# Patient Record
Sex: MALE | Race: OTHER | HISPANIC OR LATINO | Employment: FULL TIME | URBAN - METROPOLITAN AREA
[De-identification: names, ages, dates, MRNs, and addresses within clinical notes are randomized per-mention and may not be internally consistent; named-entity substitution may affect disease eponyms.]

---

## 2024-05-11 ENCOUNTER — HOSPITAL ENCOUNTER (EMERGENCY)
Facility: MEDICAL CENTER | Age: 19
End: 2024-05-11
Attending: EMERGENCY MEDICINE
Payer: COMMERCIAL

## 2024-05-11 ENCOUNTER — APPOINTMENT (OUTPATIENT)
Dept: RADIOLOGY | Facility: MEDICAL CENTER | Age: 19
End: 2024-05-11
Attending: EMERGENCY MEDICINE
Payer: COMMERCIAL

## 2024-05-11 VITALS
BODY MASS INDEX: 23.36 KG/M2 | HEIGHT: 68 IN | HEART RATE: 74 BPM | TEMPERATURE: 97.9 F | OXYGEN SATURATION: 97 % | RESPIRATION RATE: 18 BRPM | WEIGHT: 154.1 LBS | DIASTOLIC BLOOD PRESSURE: 100 MMHG | SYSTOLIC BLOOD PRESSURE: 134 MMHG

## 2024-05-11 DIAGNOSIS — M25.571 ACUTE RIGHT ANKLE PAIN: ICD-10-CM

## 2024-05-11 RX ORDER — ACETAMINOPHEN 325 MG/1
650 TABLET ORAL ONCE
Status: COMPLETED | OUTPATIENT
Start: 2024-05-11 | End: 2024-05-11

## 2024-05-11 RX ADMIN — ACETAMINOPHEN 650 MG: 325 TABLET ORAL at 19:30

## 2024-05-12 NOTE — ED PROVIDER NOTES
"ED Provider Note    CHIEF COMPLAINT  Chief Complaint   Patient presents with    Ankle Pain     Right sided ankle pain for past couple weeks. Playing sports a couple weeks ago and jumped and landed awkwardly on ankle. Pt states \"my work wants me to get it checked out\" denies workmans comp case. Pt endorses pain and swelling to right ankle.        EXTERNAL RECORDS REVIEWED  Other None available    HPI/ROS  LIMITATION TO HISTORY   Select: : None  OUTSIDE HISTORIAN(S):  None    Sumeet Levin is a 19 y.o. male who presents to the emergency department for evaluation of ankle pain.  The patient states that he jumped and landed on someone else's foot 2 weeks ago.  He states that his right ankle rolled underneath him.  He did fall down but did not hit his head.  He states that he has been having persistent pain and swelling of the right lateral ankle.  He states that there was bruising on the foot and the ankle previously but this has improved.  He is coming in today because of the persistent swelling.  He has been able to ambulate on it.  He denies any other injuries.  He is otherwise healthy and does not take any daily medications.  He denies any other complaints at this point.    PAST MEDICAL HISTORY   has a past medical history of Patient denies medical problems.    SURGICAL HISTORY  patient denies any surgical history    FAMILY HISTORY  History reviewed. No pertinent family history.    SOCIAL HISTORY  Social History     Tobacco Use    Smoking status: Never    Smokeless tobacco: Never   Vaping Use    Vaping Use: Never used   Substance and Sexual Activity    Alcohol use: Yes    Drug use: Yes     Comment: weed    Sexual activity: Not on file       CURRENT MEDICATIONS  Home Medications       Reviewed by Farooq Contreras R.N. (Registered Nurse) on 05/11/24 at 1808  Med List Status: Not Addressed     Medication Last Dose Status        Patient Thomas Taking any Medications                           ALLERGIES  No Known " "Allergies    PHYSICAL EXAM  VITAL SIGNS: /89   Pulse 67   Temp 36.6 °C (97.9 °F) (Temporal)   Resp 18   Ht 1.727 m (5' 8\")   Wt 69.9 kg (154 lb 1.6 oz)   SpO2 96%   BMI 23.43 kg/m²   Constitutional: Alert and in no apparent distress.  HENT: Normocephalic atraumatic. Bilateral external ears normal. Nose normal. Mucous membranes are moist.  Eyes: Pupils are equal and reactive. Conjunctiva normal. Non-icteric sclera.   Cardiovascular: Regular rate and rhythm. No murmurs, gallops or rubs.  Thorax & Lungs: No retractions, nasal flaring, or tachypnea. Breath sounds are clear to auscultation bilaterally. No wheezing, rhonchi or rales.  Back: No bony tenderness, No CVA tenderness.   Extremities: 2+ peripheral pulses. Cap refill is less than 2 seconds. No edema, cyanosis, or clubbing.  Musculoskeletal: Good range of motion in all major joints. No tenderness to palpation or major deformities noted.  Focused exam of the right lower extremity: There is obvious swelling and tenderness to palpation over the right lateral malleolus.  The patient has normal dorsiflexion and plantarflexion.  Sensation is grossly intact.  2+ dorsalis pedis pulse.  No tenderness palpation over the head of the fifth metatarsal or the dorsum of the foot.  Neurologic: Alert and appropriate for age. The patient moves all 4 extremities without obvious deficits.    RADIOLOGY/PROCEDURES   I have independently interpreted the diagnostic imaging associated with this visit and am waiting the final reading from the radiologist.   My preliminary interpretation is as follows: No fracture or dislocation noted    Radiologist interpretation:  DX-ANKLE 3+ VIEWS RIGHT   Final Result      No evidence of fracture or dislocation.          COURSE & MEDICAL DECISION MAKING    ASSESSMENT, COURSE AND PLAN  Care Narrative: This is a 19-year-old male presenting to the emergency department for evaluation of ankle pain.  On initial evaluation, the patient did not " appear to be in any acute distress.  Vital signs were normal and reassuring.  Physical exam was notable for swelling and tenderness to palpation over the right lateral malleolus.  No joint erythema or warmth concerning for septic arthritis was noted and the patient had good dorsiflexion and plantarflexion without significant tenderness.  He had no tenderness palpation over the head of the fifth metatarsal or dorsum of the foot concerning for Tobar fracture or Lisfranc injury.    Plain films of the ankle were obtained and there is no evidence of fracture or dislocation.  I suspect this is likely an ankle sprain.    The patient was given a dose of Tylenol here in the ED.  Upon reevaluation, he is resting comfortably and in no acute distress.  Vital signs were normal.  I discussed supportive management with him and encouraged him to follow-up with his primary care physician.  He will return to the ED with any worsening signs or symptoms.    ADDITIONAL PROBLEMS MANAGED  Right ankle pain    DISPOSITION AND DISCUSSIONS  I have discussed management of the patient with the following physicians and CATHY's: None    Discussion of management with other QHP or appropriate source(s): None     Escalation of care considered, and ultimately not performed:acute inpatient care management, however at this time, the patient is most appropriate for outpatient management    Barriers to care at this time, including but not limited to:  None .     Decision tools and prescription drugs considered including, but not limited to:  None .    FINAL IMPRESSION  1. Acute right ankle pain      PRESCRIPTIONS  New Prescriptions    No medications on file     FOLLOW UP  [facility], Emergency Dept  [address]  [facility] [address]  [phone]  Go to   As needed    -DISCHARGE-      Electronically signed by: Cherie Vora D.O., 5/11/2024 6:56 PM

## 2024-05-12 NOTE — ED TRIAGE NOTES
"BIB fiance for following complaints.     Chief Complaint   Patient presents with    Ankle Pain     Right sided ankle pain for past couple weeks. Playing sports a couple weeks ago and jumped and landed awkwardly on ankle. Pt states \"my work wants me to get it checked out\" denies workmans comp case. Pt endorses pain and swelling to right ankle.      /89   Pulse 67   Temp 36.6 °C (97.9 °F) (Temporal)   Resp 18   Ht 1.727 m (5' 8\")   Wt 69.9 kg (154 lb 1.6 oz)   SpO2 96%   BMI 23.43 kg/m²     "